# Patient Record
Sex: MALE | Race: AMERICAN INDIAN OR ALASKA NATIVE | NOT HISPANIC OR LATINO
[De-identification: names, ages, dates, MRNs, and addresses within clinical notes are randomized per-mention and may not be internally consistent; named-entity substitution may affect disease eponyms.]

---

## 2020-07-27 ENCOUNTER — TRANSCRIPTION ENCOUNTER (OUTPATIENT)
Age: 25
End: 2020-07-27

## 2021-06-25 ENCOUNTER — OUTPATIENT (OUTPATIENT)
Dept: OUTPATIENT SERVICES | Facility: HOSPITAL | Age: 26
LOS: 1 days | End: 2021-06-25
Payer: COMMERCIAL

## 2021-06-25 ENCOUNTER — APPOINTMENT (OUTPATIENT)
Dept: MRI IMAGING | Facility: CLINIC | Age: 26
End: 2021-06-25
Payer: COMMERCIAL

## 2021-06-25 DIAGNOSIS — Z00.8 ENCOUNTER FOR OTHER GENERAL EXAMINATION: ICD-10-CM

## 2021-06-25 PROCEDURE — 75561 CARDIAC MRI FOR MORPH W/DYE: CPT | Mod: 26

## 2021-06-25 PROCEDURE — 75561 CARDIAC MRI FOR MORPH W/DYE: CPT

## 2021-06-25 PROCEDURE — A9585: CPT

## 2021-08-10 DIAGNOSIS — R00.2 PALPITATIONS: ICD-10-CM

## 2021-08-24 ENCOUNTER — APPOINTMENT (OUTPATIENT)
Dept: ELECTROPHYSIOLOGY | Facility: CLINIC | Age: 26
End: 2021-08-24
Payer: COMMERCIAL

## 2021-08-24 ENCOUNTER — NON-APPOINTMENT (OUTPATIENT)
Age: 26
End: 2021-08-24

## 2021-08-24 VITALS
OXYGEN SATURATION: 99 % | HEIGHT: 68 IN | SYSTOLIC BLOOD PRESSURE: 105 MMHG | DIASTOLIC BLOOD PRESSURE: 71 MMHG | WEIGHT: 196 LBS | HEART RATE: 62 BPM | BODY MASS INDEX: 29.7 KG/M2

## 2021-08-24 DIAGNOSIS — Z78.9 OTHER SPECIFIED HEALTH STATUS: ICD-10-CM

## 2021-08-24 DIAGNOSIS — I49.3 VENTRICULAR PREMATURE DEPOLARIZATION: ICD-10-CM

## 2021-08-24 PROCEDURE — 99203 OFFICE O/P NEW LOW 30 MIN: CPT

## 2021-08-24 PROCEDURE — 93000 ELECTROCARDIOGRAM COMPLETE: CPT

## 2021-08-24 NOTE — PHYSICAL EXAM
[Well Developed] : well developed [Well Nourished] : well nourished [No Acute Distress] : no acute distress [Normal Conjunctiva] : normal conjunctiva [Normal Venous Pressure] : normal venous pressure [No Carotid Bruit] : no carotid bruit [Normal S1, S2] : normal S1, S2 [No Murmur] : no murmur [No Rub] : no rub [No Gallop] : no gallop [Clear Lung Fields] : clear lung fields [Good Air Entry] : good air entry [No Respiratory Distress] : no respiratory distress  [Soft] : abdomen soft [Non Tender] : non-tender [No Masses/organomegaly] : no masses/organomegaly [Normal Bowel Sounds] : normal bowel sounds [Normal Gait] : normal gait [No Edema] : no edema [No Cyanosis] : no cyanosis [No Rash] : no rash [Moves all extremities] : moves all extremities [No Focal Deficits] : no focal deficits [Normal Speech] : normal speech [Alert and Oriented] : alert and oriented [Normal memory] : normal memory

## 2021-08-25 ENCOUNTER — NON-APPOINTMENT (OUTPATIENT)
Age: 26
End: 2021-08-25

## 2021-08-25 PROBLEM — I49.3 PVC (PREMATURE VENTRICULAR CONTRACTION): Status: ACTIVE | Noted: 2021-08-24

## 2021-08-25 NOTE — DISCUSSION/SUMMARY
[FreeTextEntry1] : In summary, Mr Comer is a 25 year old gentleman with no significant past medical history but developed symptomatic PVCs a week post his COVID vaccine with his symptoms resolving about a month later. His echocardiogram and cardiac MRI showed no structural heart disease and no LGE. His PVC may have been an inflammatory response to the vaccine. Although he reports no further symptoms since May, we will arrange for a 2 week Ziopatch to assess his PVC burden now that he several months post his vaccine. He will call us when his results are in to review the results. If there is no significant PVC burden, there is no further intervention required at that point.

## 2021-08-25 NOTE — END OF VISIT
[FreeTextEntry3] : seen and examined with PA. I agree with H and P, A and P\par Ventricular ectopy may have been secondary to myocarditis from vaccine, but there is no evidence of resultant scarring.  Symptoms have abated.  I suggested a 2 week Zio to screen for the presence of remaining arrhythmia.

## 2021-08-25 NOTE — CARDIOLOGY SUMMARY
[de-identified] : today:  Sinus  Rhythm \par -RSR(V1) -nondiagnostic.  [de-identified] : 24 hour holter May 18, 2021\par sinus bradycardia, APCs, PVC, up to 12 beats of AIVR [de-identified] : TTE 5/19/2021:\par Normal aortic valve\par Normal LA\par Normal LV structure and function\par EF is estimated at 63%\par Trance MR [de-identified] : 6/25/2021\par Normal LVEF \par No convincing focus of LGE to suggest fibrosis or scarring.

## 2021-08-25 NOTE — HISTORY OF PRESENT ILLNESS
[FreeTextEntry1] : I had the pleasure of seeing Colt Comer today for consultation in the arrhythmia clinic at [facility]. As you well know, he is a pleasant 25 year old gentleman with no significant past medical history who started to develop palpitations in April. He reports that he took the [employer] COVID vaccine and about a week later started to experience palpitations. He reports that it would  be intermittent throughout the day and started to worse for two weeks post the vaccine. He described his palpitations where he would feel his heart racing for briefly for a couple seconds and then it would stop before starting up again. He denies any chest pain, shortness of breath, near syncope or syncope. He states that his palpitations lasted for about 1 month before completely resolving. He reports that he has not had any palpitation since May. At one point he was placed on Toprol 25mg daily however he states that he took it twice but then discontinued as his symptoms were starting to resolve. He had an Echocardiogram performed which was normal as well as a negative cardiac MRI. A 24 hour holter demonstrated about a 1.3% PVC burden with the longest episode of ventricular ectopy being 12 beats of an AIVR at 75 bpm.

## 2021-09-16 PROCEDURE — 93248 EXT ECG>7D<15D REV&INTERPJ: CPT

## 2022-08-19 ENCOUNTER — TRANSCRIPTION ENCOUNTER (OUTPATIENT)
Age: 27
End: 2022-08-19

## 2023-05-20 ENCOUNTER — OFFICE (OUTPATIENT)
Dept: URBAN - METROPOLITAN AREA CLINIC 90 | Facility: CLINIC | Age: 28
Setting detail: OPHTHALMOLOGY
End: 2023-05-20
Payer: COMMERCIAL

## 2023-05-20 DIAGNOSIS — H52.7: ICD-10-CM

## 2023-05-20 DIAGNOSIS — H57.02: ICD-10-CM

## 2023-05-20 DIAGNOSIS — H11.133: ICD-10-CM

## 2023-05-20 DIAGNOSIS — H52.13: ICD-10-CM

## 2023-05-20 PROCEDURE — 92015 DETERMINE REFRACTIVE STATE: CPT | Performed by: OPHTHALMOLOGY

## 2023-05-20 PROCEDURE — 92004 COMPRE OPH EXAM NEW PT 1/>: CPT | Performed by: OPHTHALMOLOGY

## 2023-05-20 ASSESSMENT — REFRACTION_MANIFEST
OD_CYLINDER: -0.75
OS_CYLINDER: -0.25
OD_AXIS: 125
OD_VA1: 20/20
OS_SPHERE: -1.25
OD_SPHERE: -1.25
OS_VA1: 20/20
OS_AXIS: 070

## 2023-05-20 ASSESSMENT — AXIALLENGTH_DERIVED
OS_AL: 25.1414
OS_AL: 25.1414
OD_AL: 25.2525
OD_AL: 25.1968

## 2023-05-20 ASSESSMENT — KERATOMETRY
OS_AXISANGLE_DEGREES: 130
OD_K1POWER_DIOPTERS: 40.75
OD_K2POWER_DIOPTERS: 41.25
OS_K2POWER_DIOPTERS: 41.25
METHOD_AUTO_MANUAL: AUTO
OD_AXISANGLE_DEGREES: 060
OS_K1POWER_DIOPTERS: 40.75

## 2023-05-20 ASSESSMENT — SPHEQUIV_DERIVED
OS_SPHEQUIV: -1.375
OD_SPHEQUIV: -1.5
OD_SPHEQUIV: -1.625
OS_SPHEQUIV: -1.375

## 2023-05-20 ASSESSMENT — CONFRONTATIONAL VISUAL FIELD TEST (CVF)
OD_FINDINGS: FULL
OS_FINDINGS: FULL

## 2023-05-20 ASSESSMENT — REFRACTION_AUTOREFRACTION
OD_AXIS: 118
OS_AXIS: 073
OD_SPHERE: -1.25
OS_SPHERE: -1.25
OD_CYLINDER: -0.50
OS_CYLINDER: -0.25

## 2023-05-20 ASSESSMENT — VISUAL ACUITY
OD_BCVA: 20/50+1
OS_BCVA: 20/60+1

## 2023-05-20 ASSESSMENT — TONOMETRY
OD_IOP_MMHG: 12
OS_IOP_MMHG: 12